# Patient Record
Sex: FEMALE | Race: WHITE | NOT HISPANIC OR LATINO | Employment: UNEMPLOYED | URBAN - METROPOLITAN AREA
[De-identification: names, ages, dates, MRNs, and addresses within clinical notes are randomized per-mention and may not be internally consistent; named-entity substitution may affect disease eponyms.]

---

## 2020-09-24 ENCOUNTER — OFFICE VISIT (OUTPATIENT)
Dept: FAMILY MEDICINE CLINIC | Facility: CLINIC | Age: 20
End: 2020-09-24

## 2020-09-24 VITALS
HEIGHT: 63 IN | RESPIRATION RATE: 18 BRPM | HEART RATE: 109 BPM | DIASTOLIC BLOOD PRESSURE: 84 MMHG | BODY MASS INDEX: 39.73 KG/M2 | TEMPERATURE: 98.1 F | WEIGHT: 224.2 LBS | OXYGEN SATURATION: 96 % | SYSTOLIC BLOOD PRESSURE: 122 MMHG

## 2020-09-24 DIAGNOSIS — F33.1 MODERATE RECURRENT MAJOR DEPRESSION (HCC): ICD-10-CM

## 2020-09-24 DIAGNOSIS — E66.9 OBESITY (BMI 35.0-39.9 WITHOUT COMORBIDITY): ICD-10-CM

## 2020-09-24 DIAGNOSIS — Z00.00 ANNUAL PHYSICAL EXAM: Primary | ICD-10-CM

## 2020-09-24 DIAGNOSIS — F41.9 ANXIETY: ICD-10-CM

## 2020-09-24 PROCEDURE — 99385 PREV VISIT NEW AGE 18-39: CPT | Performed by: FAMILY MEDICINE

## 2020-09-24 RX ORDER — CETIRIZINE HYDROCHLORIDE 10 MG/1
10 TABLET ORAL DAILY
COMMUNITY

## 2020-09-24 RX ORDER — FLUOXETINE 20 MG/1
20 TABLET, FILM COATED ORAL DAILY
COMMUNITY
End: 2020-09-28 | Stop reason: SDUPTHER

## 2020-09-24 RX ORDER — MELATONIN
1000 DAILY
COMMUNITY

## 2020-09-24 NOTE — PROGRESS NOTES
Assessment/Plan:       Problem List Items Addressed This Visit        Other    Moderate recurrent major depression ([facility] [address] )     +phq9 today  She notes she is seeing a therapist and feels that the prozac 20 mg is working well for her  She does not feel she needs to change dose  Relevant Medications    FLUoxetine (PROzac) 20 MG tablet    Anxiety- chronic, see above    Obesity (BMI 35 0-39 9 without comorbidity)     We discussed the importance of healthy diet/exercise  She notes strongly that she switched physicians b/c she did not want to be talked to about her weight or made to feel ashamed  We discussed approaching weight as more of a process of good health and she seems more comfortable w/ that  Does not feel ready to make changes at this point  Other Visit Diagnoses     Annual physical exam    -  Rodri-   Reviewed health maintenance/preventive care  Discussed healthy diet, activity for good health  Reviewed appropriate vaccinations- pt/mom will obtain records  Screening labwork ordered  Discussed pap test- she will think about this- she has not been sexually active and does not plan to be anytime soon  Relevant Orders    Comprehensive metabolic panel    Lipid Panel with Direct LDL reflex    TSH, 3rd generation with Free T4 reflex               Subjective:     Suzy Lockwood is a 22 y o  female here today with her mother and has the below chronic conditions:    Patient Active Problem List   Diagnosis    Moderate recurrent major depression ([facility] [address] )    Anxiety    Obesity (BMI 35 0-39 9 without comorbidity)     Current Outpatient Medications   Medication Sig Dispense Refill    cetirizine (ZyrTEC) 10 mg tablet Take 10 mg by mouth daily      cholecalciferol (VITAMIN D3) 1,000 units tablet Take 1,000 Units by mouth daily      FLUoxetine (PROzac) 20 MG tablet Take 20 mg by mouth daily       No current facility-administered medications for this visit             HPI:  Chief Complaint   Patient presents with    Establish Care     - CC above per clinical staff and reviewed  Pt here w mom to establish  Switching doctors b/c she felt like she was being judged for her weight  Doesn't want to work on weight loss  Counseling every 2 weeks  Anxiety and depression for several years  Prozac 20 mg seems to be helping well and she feels this dose is working well for her  Same therapist since 2015  Chante Huertas- in [facility] from [facility] earlier this year  Looking for jobs, living at home  [employer], [employer]  Is an introvert and is not bothered too much by pandemic- says she doesn't go out that much anyway  A little lactose intolerance- uses lactaid  A little gerd- every four months uses omeprazole for two weeks  This is enough to hold symptoms  The following portions of the patient's history were reviewed and updated as appropriate: allergies, current medications, past family history, past medical history, past social history, past surgical history and problem list     ROS:  Review of Systems   No fever, chills, congestion, chest pain, shortness of breath, nausea, vomiting, diarrhea, constipation, blood in stool, urinary concerns   Rest of ROS neg except as above  Objective:      /84   Pulse (!) 109   Temp 98 1 °F (36 7 °C) (Tympanic)   Resp 18   Ht 5' 3" (1 6 m)   Wt 102 kg (224 lb 3 2 oz)   SpO2 96%   BMI 39 72 kg/m²   BP Readings from Last 3 Encounters:   09/24/20 122/84     Wt Readings from Last 3 Encounters:   09/24/20 102 kg (224 lb 3 2 oz)               Physical Exam:   Physical Exam  Vitals signs and nursing note reviewed  Constitutional:       General: She is not in acute distress  Appearance: She is well-developed  She is obese  She is not ill-appearing  HENT:      Head: Normocephalic and atraumatic        Right Ear: Tympanic membrane and ear canal normal       Left Ear: Tympanic membrane and ear canal normal    Eyes:      Conjunctiva/sclera: Conjunctivae normal    Neck:      Musculoskeletal: Neck supple  Vascular: No carotid bruit  Cardiovascular:      Rate and Rhythm: Normal rate and regular rhythm  Heart sounds: Normal heart sounds  No murmur  Pulmonary:      Effort: Pulmonary effort is normal  No respiratory distress  Breath sounds: Normal breath sounds  No wheezing  Abdominal:      Palpations: Abdomen is soft  Tenderness: There is no abdominal tenderness  There is no guarding or rebound  Musculoskeletal:      Right lower leg: No edema  Left lower leg: No edema  Lymphadenopathy:      Cervical: No cervical adenopathy  Skin:     General: Skin is warm and dry  Neurological:      Mental Status: She is alert and oriented to person, place, and time     Psychiatric:         Mood and Affect: Mood normal          Behavior: Behavior normal

## 2020-09-26 PROBLEM — E66.9 OBESITY (BMI 35.0-39.9 WITHOUT COMORBIDITY): Status: ACTIVE | Noted: 2020-09-26

## 2020-09-26 PROBLEM — F41.9 ANXIETY: Status: ACTIVE | Noted: 2020-09-26

## 2020-09-26 PROBLEM — F33.1 MODERATE RECURRENT MAJOR DEPRESSION (HCC): Status: ACTIVE | Noted: 2020-09-26

## 2020-09-26 NOTE — ASSESSMENT & PLAN NOTE
We discussed the importance of healthy diet/exercise  She notes that she switched physicians b/c she did not want to be talked to about her weight or made to feel ashamed  We discussed approaching weight as more of a process of good health and she seems more comfortable w/ that  Does not feel ready to make changes at this point

## 2020-09-26 NOTE — ASSESSMENT & PLAN NOTE
+phq9 today  She notes she is seeing a therapist and feels that the prozac 20 mg is working well for her  She does not feel she needs to change dose

## 2020-09-28 DIAGNOSIS — F33.1 MODERATE RECURRENT MAJOR DEPRESSION (HCC): Primary | ICD-10-CM

## 2020-09-28 NOTE — TELEPHONE ENCOUNTER
Medication: pROZAC 20 MG   Last refilled: 9/24/20  Last Office Visit:  9/24/20  Next Office Visit: 12/24/20  Pharmacy:

## 2020-09-29 RX ORDER — FLUOXETINE 20 MG/1
20 TABLET, FILM COATED ORAL DAILY
Qty: 90 TABLET | Refills: 1 | Status: SHIPPED | OUTPATIENT
Start: 2020-09-29 | End: 2021-03-22 | Stop reason: SDUPTHER

## 2020-12-24 ENCOUNTER — TELEMEDICINE (OUTPATIENT)
Dept: FAMILY MEDICINE CLINIC | Facility: CLINIC | Age: 20
End: 2020-12-24

## 2020-12-24 DIAGNOSIS — F33.1 MODERATE RECURRENT MAJOR DEPRESSION (HCC): Primary | ICD-10-CM

## 2020-12-24 PROCEDURE — 99213 OFFICE O/P EST LOW 20 MIN: CPT | Performed by: FAMILY MEDICINE

## 2021-03-22 DIAGNOSIS — F33.1 MODERATE RECURRENT MAJOR DEPRESSION (HCC): ICD-10-CM

## 2021-03-22 RX ORDER — FLUOXETINE 20 MG/1
20 TABLET, FILM COATED ORAL DAILY
Qty: 90 TABLET | Refills: 0 | Status: SHIPPED | OUTPATIENT
Start: 2021-03-22 | End: 2021-04-28 | Stop reason: SDUPTHER

## 2021-03-22 NOTE — TELEPHONE ENCOUNTER
Medication refill requested: prozac  Last office visit: 12/24/20  Next office visit: 4/28/21  Last refilled: 9/29/20 #90x1  Pharmacy:   [address] [place] [address]  [address] [place] [id number]  Phone: [phone] Fax: [phone]      Pended: 90x0

## 2021-04-13 DIAGNOSIS — Z23 ENCOUNTER FOR IMMUNIZATION: ICD-10-CM

## 2021-04-28 ENCOUNTER — TELEMEDICINE (OUTPATIENT)
Dept: FAMILY MEDICINE CLINIC | Facility: CLINIC | Age: 21
End: 2021-04-28

## 2021-04-28 VITALS — WEIGHT: 224 LBS | HEIGHT: 63 IN | BODY MASS INDEX: 39.69 KG/M2

## 2021-04-28 DIAGNOSIS — F33.1 MODERATE RECURRENT MAJOR DEPRESSION (HCC): Primary | ICD-10-CM

## 2021-04-28 PROCEDURE — 99213 OFFICE O/P EST LOW 20 MIN: CPT | Performed by: FAMILY MEDICINE

## 2021-04-28 RX ORDER — FLUOXETINE 20 MG/1
20 TABLET, FILM COATED ORAL DAILY
Qty: 90 TABLET | Refills: 1 | Status: SHIPPED | OUTPATIENT
Start: 2021-04-28 | End: 2021-07-12 | Stop reason: SDUPTHER

## 2021-04-28 NOTE — PROGRESS NOTES
Virtual Regular Visit      Assessment/Plan:    Problem List Items Addressed This Visit        Other    Moderate recurrent major depression ([facility] [address] ) - Primary     Continue prozac 20 mg daily  Continue seeing therapist as scheduled  No changes today  F/u in about six months, sooner if needed  Relevant Medications    FLUoxetine (PROzac) 20 MG tablet          BMI Counseling: Body mass index is 39 68 kg/m²  The BMI is above normal  Nutrition recommendations include encouraging healthy choices of fruits and vegetables  Reason for visit is   Chief Complaint   Patient presents with    Follow-up     mood     Virtual Regular Visit        Encounter provider Ashlee Tang MD    Provider located at [address] [facility]  [address] [address] [phone]      Recent Visits  Date Type Provider Dept   04/28/21 Telemedicine Ashlee Tang MD Pg  [facility] recent visits within past 7 days and meeting all other requirements     Future Appointments  No visits were found meeting these conditions  Showing future appointments within next 150 days and meeting all other requirements        The patient was identified by name and date of birth  Betsey Koyanagi was informed that this is a telemedicine visit and that the visit is being conducted through [facility] and patient was informed that this is a secure, HIPAA-compliant platform  She agrees to proceed     My office door was closed  No one else was in the room  She acknowledged consent and understanding of privacy and security of the video platform  The patient has agreed to participate and understands they can discontinue the visit at any time  Patient is aware this is a billable service  Subjective  Betsey Koyanagi is a 23 y o  female who says she is doing overall well  She feels she is doing well overall  Feels like prozac is at a good dose       She is getting more fresh air in screened porch and this is helping  No sleep trouble  Still seeing therapist every two weeks  Seasonal allergies- zyrtec daily  No nasal spray  Gets Covid-19 next Friday  HPI     Past Medical History:   Diagnosis Date    Anxiety     Depression        Past Surgical History:   Procedure Laterality Date    NO PAST SURGERIES         Current Outpatient Medications   Medication Sig Dispense Refill    cetirizine (ZyrTEC) 10 mg tablet Take 10 mg by mouth daily      cholecalciferol (VITAMIN D3) 1,000 units tablet Take 1,000 Units by mouth daily      FLUoxetine (PROzac) 20 MG tablet Take 1 tablet (20 mg total) by mouth daily 90 tablet 1     No current facility-administered medications for this visit  Allergies   Allergen Reactions    Pollen Extract        Review of Systems    Video Exam    Vitals:    04/28/21 1015   Weight: 102 kg (224 lb)   Height: 5' 3" (1 6 m)     Wt Readings from Last 3 Encounters:   04/28/21 102 kg (224 lb)   09/24/20 102 kg (224 lb 3 2 oz)         Physical Exam  Vitals signs and nursing note reviewed  Constitutional:       Appearance: Normal appearance  She is not ill-appearing  HENT:      Head: Normocephalic and atraumatic  Pulmonary:      Effort: Pulmonary effort is normal  No respiratory distress  Neurological:      Mental Status: She is alert and oriented to person, place, and time  Psychiatric:         Mood and Affect: Mood normal          Behavior: Behavior normal           I spent 15 minutes directly with the patient during this visit      VIRTUAL VISIT DISCLAIMER    Cornel Blanton acknowledges that she has consented to an online visit or consultation  She understands that the online visit is based solely on information provided by her, and that, in the absence of a face-to-face physical evaluation by the physician, the diagnosis she receives is both limited and provisional in terms of accuracy and completeness   This is not intended to replace a full medical face-to-face evaluation by the physician  Kinsey Renner [facility] understands and accepts these terms

## 2021-04-30 ENCOUNTER — IMMUNIZATIONS (OUTPATIENT)
Dept: FAMILY MEDICINE CLINIC | Facility: HOSPITAL | Age: 21
End: 2021-04-30

## 2021-04-30 DIAGNOSIS — Z23 ENCOUNTER FOR IMMUNIZATION: Primary | ICD-10-CM

## 2021-04-30 PROCEDURE — 0001A SARS-COV-2 / COVID-19 MRNA VACCINE (PFIZER-BIONTECH) 30 MCG: CPT | Performed by: PHYSICIAN ASSISTANT

## 2021-04-30 PROCEDURE — 91300 SARS-COV-2 / COVID-19 MRNA VACCINE (PFIZER-BIONTECH) 30 MCG: CPT | Performed by: PHYSICIAN ASSISTANT

## 2021-04-30 NOTE — ASSESSMENT & PLAN NOTE
Continue prozac 20 mg daily  Continue seeing therapist as scheduled  No changes today  F/u in about six months, sooner if needed

## 2021-05-22 ENCOUNTER — IMMUNIZATIONS (OUTPATIENT)
Dept: FAMILY MEDICINE CLINIC | Facility: HOSPITAL | Age: 21
End: 2021-05-22

## 2021-05-22 DIAGNOSIS — Z23 ENCOUNTER FOR IMMUNIZATION: Primary | ICD-10-CM

## 2021-05-22 PROCEDURE — 91300 SARS-COV-2 / COVID-19 MRNA VACCINE (PFIZER-BIONTECH) 30 MCG: CPT

## 2021-05-22 PROCEDURE — 0002A SARS-COV-2 / COVID-19 MRNA VACCINE (PFIZER-BIONTECH) 30 MCG: CPT

## 2021-07-12 ENCOUNTER — TELEMEDICINE (OUTPATIENT)
Dept: FAMILY MEDICINE CLINIC | Facility: CLINIC | Age: 21
End: 2021-07-12

## 2021-07-12 VITALS — HEIGHT: 63 IN | WEIGHT: 224 LBS | BODY MASS INDEX: 39.69 KG/M2

## 2021-07-12 DIAGNOSIS — F33.1 MODERATE RECURRENT MAJOR DEPRESSION (HCC): Primary | ICD-10-CM

## 2021-07-12 PROCEDURE — 99213 OFFICE O/P EST LOW 20 MIN: CPT | Performed by: FAMILY MEDICINE

## 2021-07-12 RX ORDER — FLUOXETINE 10 MG/1
30 TABLET, FILM COATED ORAL DAILY
Qty: 90 TABLET | Refills: 2 | Status: SHIPPED | OUTPATIENT
Start: 2021-07-12 | End: 2021-12-02 | Stop reason: SDUPTHER

## 2021-07-12 NOTE — PROGRESS NOTES
Virtual Regular Visit      Assessment/Plan:    Problem List Items Addressed This Visit        Other    Moderate recurrent major depression ([address] ) - Primary     Increase prozac to 30 mg daily  Patient will message me through epic in about 2-4 weeks with update on how she is feeling, sooner if feeling any worse  Continue close f/u with therapist          Relevant Medications    FLUoxetine (PROzac) 10 MG tablet               Reason for visit is   Chief Complaint   Patient presents with    Depression     Looking to discuss dosage of my antidepressants    Virtual Regular Visit        Encounter provider Amilcar Matthew MD    Provider located at [address]  [address]  [place] [address]  [phone]      Recent Visits  No visits were found meeting these conditions  Showing recent visits within past 7 days and meeting all other requirements  Today's Visits  Date Type Provider Dept   07/12/21 Telemedicine Amilcar Matthew MD [facility]g [facility] today's visits and meeting all other requirements  Future Appointments  No visits were found meeting these conditions  Showing future appointments within next 150 days and meeting all other requirements       The patient was identified by name and date of birth  Helen Nathan was informed that this is a telemedicine visit and that the visit is being conducted through [address] [place] [address] Now and patient was informed that this is a secure, HIPAA-compliant platform  She agrees to proceed     My office door was closed  No one else was in the room  She acknowledged consent and understanding of privacy and security of the video platform  The patient has agreed to participate and understands they can discontinue the visit at any time  Patient is aware this is a billable service  Subjective  Helen Nathan is a 23 y o  female Following up for depression  Her mother is present for the virtual visit as well    She is currently taking prozac 20 mg daily and following up with her therapist regularly  Her therapist has mentioned that she should probably increase prozac  Feeling more anxious, more depressed  Feeling more tired, more sluggish  Not enough motivation  Has never been on higher dose than 20 mg prozac in the past     PHQ-9 Depression Screening    PHQ-9:   Frequency of the following problems over the past two weeks:      Little interest or pleasure in doing things: 1 - several days  Feeling down, depressed, or hopeless: 2 - more than half the days  Trouble falling or staying asleep, or sleeping too much: 0 - not at all  Feeling tired or having little energy: 2 - more than half the days  Poor appetite or overeatin - several days  Feeling bad about yourself - or that you are a failure or have let yourself or your family down: 2 - more than half the days  Trouble concentrating on things, such as reading the newspaper or watching television: 1 - several days  Moving or speaking so slowly that other people could have noticed  Or the opposite - being so fidgety or restless that you have been moving around a lot more than usual: 0 - not at all  Thoughts that you would be better off dead, or of hurting yourself in some way: 0 - not at all  PHQ-2 Score: 3  PHQ-9 Score: 9             HPI     Past Medical History:   Diagnosis Date    Anxiety     Depression        Past Surgical History:   Procedure Laterality Date    NO PAST SURGERIES         Current Outpatient Medications   Medication Sig Dispense Refill    cetirizine (ZyrTEC) 10 mg tablet Take 10 mg by mouth daily      cholecalciferol (VITAMIN D3) 1,000 units tablet Take 1,000 Units by mouth daily      FLUoxetine (PROzac) 10 MG tablet Take 3 tablets (30 mg total) by mouth daily 90 tablet 2     No current facility-administered medications for this visit          Allergies   Allergen Reactions    Pollen Extract        Review of Systems    Video Exam    Vitals: 07/12/21 1054   Weight: 102 kg (224 lb)   Height: 5' 3" (1 6 m)       Physical Exam  Vitals and nursing note reviewed  Constitutional:       General: She is not in acute distress  Appearance: She is not ill-appearing  Pulmonary:      Effort: No respiratory distress  Neurological:      Mental Status: She is alert and oriented to person, place, and time  Psychiatric:         Mood and Affect: Mood normal          Behavior: Behavior normal           I spent 12 minutes directly with the patient during this visit      VIRTUAL VISIT DISCLAIMER    Chivolaya Mamadou acknowledges that she has consented to an online visit or consultation  She understands that the online visit is based solely on information provided by her, and that, in the absence of a face-to-face physical evaluation by the physician, the diagnosis she receives is both limited and provisional in terms of accuracy and completeness  This is not intended to replace a full medical face-to-face evaluation by the physician  Brooke Aguirre [facility] understands and accepts these terms

## 2021-07-12 NOTE — ASSESSMENT & PLAN NOTE
Increase prozac to 30 mg daily  Patient will message me through epic in about 2-4 weeks with update on how she is feeling, sooner if feeling any worse    Continue close f/u with therapist

## 2021-12-28 ENCOUNTER — IMMUNIZATIONS (OUTPATIENT)
Dept: FAMILY MEDICINE CLINIC | Facility: HOSPITAL | Age: 21
End: 2021-12-28

## 2021-12-28 DIAGNOSIS — Z23 ENCOUNTER FOR IMMUNIZATION: Primary | ICD-10-CM

## 2021-12-28 PROCEDURE — 0001A COVID-19 PFIZER VACC 0.3 ML: CPT

## 2021-12-28 PROCEDURE — 91300 COVID-19 PFIZER VACC 0.3 ML: CPT

## 2022-02-03 DIAGNOSIS — F33.1 MODERATE RECURRENT MAJOR DEPRESSION (HCC): ICD-10-CM

## 2022-02-03 RX ORDER — FLUOXETINE 10 MG/1
30 TABLET, FILM COATED ORAL DAILY
Qty: 90 TABLET | Refills: 1 | Status: SHIPPED | OUTPATIENT
Start: 2022-02-03 | End: 2022-03-31 | Stop reason: SDUPTHER

## 2022-03-31 ENCOUNTER — OFFICE VISIT (OUTPATIENT)
Dept: FAMILY MEDICINE CLINIC | Facility: CLINIC | Age: 22
End: 2022-03-31

## 2022-03-31 VITALS
DIASTOLIC BLOOD PRESSURE: 78 MMHG | SYSTOLIC BLOOD PRESSURE: 120 MMHG | TEMPERATURE: 98 F | BODY MASS INDEX: 39.68 KG/M2 | OXYGEN SATURATION: 100 % | HEIGHT: 63 IN | HEART RATE: 103 BPM

## 2022-03-31 DIAGNOSIS — E66.9 OBESITY (BMI 35.0-39.9 WITHOUT COMORBIDITY): ICD-10-CM

## 2022-03-31 DIAGNOSIS — F33.1 MODERATE RECURRENT MAJOR DEPRESSION (HCC): ICD-10-CM

## 2022-03-31 DIAGNOSIS — E66.9 OBESITY (BMI 30-39.9): ICD-10-CM

## 2022-03-31 DIAGNOSIS — Z00.00 ANNUAL PHYSICAL EXAM: Primary | ICD-10-CM

## 2022-03-31 PROCEDURE — 99395 PREV VISIT EST AGE 18-39: CPT | Performed by: FAMILY MEDICINE

## 2022-03-31 RX ORDER — FLUOXETINE 20 MG/1
40 TABLET, FILM COATED ORAL DAILY
Qty: 180 TABLET | Refills: 1 | Status: SHIPPED | OUTPATIENT
Start: 2022-03-31 | End: 2022-06-25 | Stop reason: SDUPTHER

## 2022-03-31 NOTE — PROGRESS NOTES
Assessment/Plan:       Problem List Items Addressed This Visit        Other    Moderate recurrent major depression ([address] )     Continue working with therapist   Increase prozac to 40 mg daily  Advised setting small goals for herself  We discussed having her go to a small store and practice making a little bit of small talk when checking out  Discussed mental rehearsal for this and then practicing with situations like a  where time to talk is limited  She is concerned that her social skills have diminished with Covid-19 and social situations are particularly stressful for her  Relevant Medications    FLUoxetine (PROzac) 20 MG tablet    Obesity (BMI 35 0-39 9 without comorbidity)     Discussed diet and exercise  Discussed trying to get out to walk for both the physical and emotional benefit  Other Visit Diagnoses     Annual physical exam    -  Primary- check labs as noted  utd on Covid-19 vaccines  Has declined Tdap and HPV vax  Discussed obesity  Advised to schedule pap  Can do so here in our office or at gynecology  She will schedule when ready  Declines std screening- she has never been sexually active    Relevant Orders    Comprehensive metabolic panel    Lipid Panel with Direct LDL reflex    Obesity (BMI 30-39  9)        Relevant Orders    TSH, 3rd generation with Free T4 reflex                   Subjective:     Delores Mathew is a 19 y o  female here today with chief complaint below:  Chief Complaint   Patient presents with    Physical Exam    Labs     NONE          GYN referral pending     Medication Refill     None      - CC above per clinical staff and reviewed  HPI:  Pt is here for annual PE  Depression/anxiety- taking prozac 30 mg daily, feels she may need to increase  Usually her mood starts to improve in the spring  Menses somewhat irregular  A few years ago, less frequent  Past few months lighter periods but lasting a little longer    After Covid-19 booster, heavier period  No cramping/pain  LMP earlier this month  Not sexually active  Does not desire to be in a relationship  Dental care- not seeing dentist regular    Diet, exercise- not much exercise  Used to do yoga in basement, but friends living there now  Labs- has not yet done to date  She has remained fairly isolated at home during the pandemic and notes that some of the things that she was hoping to get comfortable with prior to the pandemic have been out of reach due to pandemic  Her mom was working with her on getting comfortable using public transportation, but that was put on hold  Struggles socially  Sees a therapist regularly  [address] in social situations    The following portions of the patient's history were reviewed and updated as appropriate: allergies, current medications, past family history, past medical history, past social history, past surgical history and problem list     ROS:  Review of Systems   No fever, chills, congestion, chest pain, shortness of breath, nausea, vomiting, diarrhea, constipation, blood in stool, urinary concerns    Rest of ROS neg except as above  Objective:      /78   Pulse 103   Temp 98 °F (36 7 °C)   Ht 5' 3" (1 6 m)   SpO2 100%   BMI 39 68 kg/m²   BP Readings from Last 3 Encounters:   03/31/22 120/78   09/24/20 122/84     Wt Readings from Last 3 Encounters:   07/12/21 102 kg (224 lb)   04/28/21 102 kg (224 lb)   09/24/20 102 kg (224 lb 3 2 oz)               Physical Exam:   Physical Exam  Vitals and nursing note reviewed  Constitutional:       Appearance: Normal appearance  She is well-developed  HENT:      Head: Normocephalic and atraumatic  Eyes:      Conjunctiva/sclera: Conjunctivae normal    Cardiovascular:      Rate and Rhythm: Normal rate and regular rhythm  Heart sounds: Normal heart sounds  No murmur heard  Pulmonary:      Effort: Pulmonary effort is normal  No respiratory distress        Breath sounds: Normal breath sounds  No wheezing  Abdominal:      Palpations: Abdomen is soft  Tenderness: There is no abdominal tenderness  There is no guarding or rebound  Musculoskeletal:      Cervical back: Neck supple  Right lower leg: No edema  Left lower leg: No edema  Lymphadenopathy:      Cervical: No cervical adenopathy  Skin:     General: Skin is warm and dry  Neurological:      Mental Status: She is alert and oriented to person, place, and time     Psychiatric:         Behavior: Behavior normal  negative Affect and characteristics of appearance, verbalizations, behaviors are appropriate

## 2022-04-01 NOTE — ASSESSMENT & PLAN NOTE
Continue working with therapist   Increase prozac to 40 mg daily  Advised setting small goals for herself  We discussed having her go to a small store and practice making a little bit of small talk when checking out  Discussed mental rehearsal for this and then practicing with situations like a  where time to talk is limited  She is concerned that her social skills have diminished with Covid-19 and social situations are particularly stressful for her

## 2022-04-01 NOTE — ASSESSMENT & PLAN NOTE
Discussed diet and exercise  Discussed trying to get out to walk for both the physical and emotional benefit

## 2022-04-13 ENCOUNTER — LAB (OUTPATIENT)
Dept: LAB | Age: 22
End: 2022-04-13
Payer: COMMERCIAL

## 2022-04-13 DIAGNOSIS — Z00.00 ANNUAL PHYSICAL EXAM: ICD-10-CM

## 2022-04-13 DIAGNOSIS — E66.9 OBESITY (BMI 30-39.9): ICD-10-CM

## 2022-04-13 LAB
ALBUMIN SERPL BCP-MCNC: 4.2 G/DL (ref 3.5–5)
ALP SERPL-CCNC: 68 U/L (ref 46–116)
ALT SERPL W P-5'-P-CCNC: 50 U/L (ref 12–78)
ANION GAP SERPL CALCULATED.3IONS-SCNC: 8 MMOL/L (ref 4–13)
AST SERPL W P-5'-P-CCNC: 29 U/L (ref 5–45)
BILIRUB SERPL-MCNC: 0.34 MG/DL (ref 0.2–1)
BUN SERPL-MCNC: 12 MG/DL (ref 5–25)
CALCIUM SERPL-MCNC: 9.6 MG/DL (ref 8.3–10.1)
CHLORIDE SERPL-SCNC: 103 MMOL/L (ref 100–108)
CHOLEST SERPL-MCNC: 204 MG/DL
CO2 SERPL-SCNC: 24 MMOL/L (ref 21–32)
CREAT SERPL-MCNC: 0.69 MG/DL (ref 0.6–1.3)
GFR SERPL CREATININE-BSD FRML MDRD: 123 ML/MIN/1.73SQ M
GLUCOSE P FAST SERPL-MCNC: 94 MG/DL (ref 65–99)
HDLC SERPL-MCNC: 39 MG/DL
LDLC SERPL CALC-MCNC: 126 MG/DL (ref 0–100)
POTASSIUM SERPL-SCNC: 3.7 MMOL/L (ref 3.5–5.3)
PROT SERPL-MCNC: 8.3 G/DL (ref 6.4–8.2)
SODIUM SERPL-SCNC: 135 MMOL/L (ref 136–145)
TRIGL SERPL-MCNC: 193 MG/DL
TSH SERPL DL<=0.05 MIU/L-ACNC: 3.21 UIU/ML (ref 0.45–4.5)

## 2022-04-13 PROCEDURE — 80061 LIPID PANEL: CPT

## 2022-04-13 PROCEDURE — 36415 COLL VENOUS BLD VENIPUNCTURE: CPT

## 2022-04-13 PROCEDURE — 84443 ASSAY THYROID STIM HORMONE: CPT

## 2022-04-13 PROCEDURE — 80053 COMPREHEN METABOLIC PANEL: CPT

## 2022-04-17 PROBLEM — E78.2 HYPERLIPIDEMIA, MIXED: Status: ACTIVE | Noted: 2022-04-17

## 2022-04-17 NOTE — RESULT ENCOUNTER NOTE
Felipe Becerra Stager shows that your cholesterol is a bit elevated  I'd like you to work on this by increasing exercise (taking walks more regularly) and working on your diet (decreasing carbs/fats in your diet)  Let's recheck the labs in six months to follow up on this  We'll mail you the lab slip  Howie Reyes MD

## 2022-06-25 DIAGNOSIS — F33.1 MODERATE RECURRENT MAJOR DEPRESSION (HCC): ICD-10-CM

## 2022-06-27 RX ORDER — FLUOXETINE 20 MG/1
40 TABLET, FILM COATED ORAL DAILY
Qty: 180 TABLET | Refills: 0 | Status: SHIPPED | OUTPATIENT
Start: 2022-06-27

## 2022-08-30 ENCOUNTER — OFFICE VISIT (OUTPATIENT)
Dept: FAMILY MEDICINE CLINIC | Facility: CLINIC | Age: 22
End: 2022-08-30

## 2022-08-30 VITALS
DIASTOLIC BLOOD PRESSURE: 74 MMHG | OXYGEN SATURATION: 98 % | HEART RATE: 103 BPM | BODY MASS INDEX: 39.68 KG/M2 | TEMPERATURE: 97.5 F | HEIGHT: 63 IN | RESPIRATION RATE: 18 BRPM | SYSTOLIC BLOOD PRESSURE: 116 MMHG

## 2022-08-30 DIAGNOSIS — F33.1 MODERATE RECURRENT MAJOR DEPRESSION (HCC): Primary | ICD-10-CM

## 2022-08-30 PROCEDURE — 99214 OFFICE O/P EST MOD 30 MIN: CPT | Performed by: FAMILY MEDICINE

## 2022-08-30 RX ORDER — BUPROPION HYDROCHLORIDE 150 MG/1
150 TABLET ORAL EVERY MORNING
Qty: 30 TABLET | Refills: 2 | Status: SHIPPED | OUTPATIENT
Start: 2022-08-30 | End: 2022-10-26 | Stop reason: SDUPTHER

## 2022-08-30 RX ORDER — FLUOXETINE 20 MG/1
20 TABLET, FILM COATED ORAL DAILY
Qty: 90 TABLET | Refills: 1 | Status: SHIPPED | OUTPATIENT
Start: 2022-08-30

## 2022-08-30 NOTE — ASSESSMENT & PLAN NOTE
Continue routine visits with therapist- following up every other week presently  Decrease prozac to 20 mg daily as she had no improvement with the increase to 40 mg   Add wellbutrin xl 150 mg daily  F/u in 2 months, sooner if needed  Reviewed use and side effects  If she feels any increased anxiety/worsening of mood- she will reach out    Good family support

## 2022-08-30 NOTE — PROGRESS NOTES
Assessment/Plan:       Problem List Items Addressed This Visit        Other    Moderate recurrent major depression ([address] ) - Primary     Continue routine visits with therapist- following up every other week presently  Decrease prozac to 20 mg daily as she had no improvement with the increase to 40 mg   Add wellbutrin xl 150 mg daily  F/u in 2 months, sooner if needed  Reviewed use and side effects  If she feels any increased anxiety/worsening of mood- she will reach out  Good family support         Relevant Medications    buPROPion (Wellbutrin XL) 150 mg 24 hr tablet    FLUoxetine (PROzac) 20 MG tablet               Subjective:     Selma Cordoba is a 19 y o  female here today with chief complaint below:  Chief Complaint   Patient presents with    Depression     Would like to talk about changing medications      - CC above per clinical staff and reviewed  HPI:  Pt here for visit with her mother to follow up on mood/depression  She notes that her depression is worse  Dad was dx with stage 4 prostate cancer at age 77 in late May  Has been a tough summer b/c of this  Mets to bone and lungs  No other major stresses  She is not working right now  Says the pandemic set her back- isn't used to being out of the house much  Tries to be home more for her dad  Some mornings she is struggling to get out of bed in the AM   Wants to ultimately find a job, but is working with her therapist on setting small goals to start, then moving more towards the bigger goals  Not getting out and walking given the recent heat, doesn't like hot weather  Would like to try changing medications  Didn't see any improvement with changing from prozac 20 mg daily to 40 mg daily  Dad is on wellbutrin and does very well with this and Dad's mom had done well with this too      PHQ-2/9 Depression Screening    Little interest or pleasure in doing things: 3 - nearly every day  Feeling down, depressed, or hopeless: 2 - more than half the days  Trouble falling or staying asleep, or sleeping too much: 1 - several days  Feeling tired or having little energy: 2 - more than half the days  Poor appetite or overeatin - not at all  Feeling bad about yourself - or that you are a failure or have let yourself or your family down: 1 - several days  Trouble concentrating on things, such as reading the newspaper or watching television: 3 - nearly every day  Moving or speaking so slowly that other people could have noticed  Or the opposite - being so fidgety or restless that you have been moving around a lot more than usual: 1 - several days  Thoughts that you would be better off dead, or of hurting yourself in some way: 0 - not at all  PHQ-9 Score: 13   PHQ-9 Interpretation: Moderate depression        CLARA-7 Flowsheet Screening    Flowsheet Row Most Recent Value   Over the last 2 weeks, how often have you been bothered by any of the following problems? Feeling nervous, anxious, or on edge 1   Not being able to stop or control worrying 1   Worrying too much about different things 1   Trouble relaxing 0   Being so restless that it is hard to sit still 0   Becoming easily annoyed or irritable 2   Feeling afraid as if something awful might happen 1   CLARA-7 Total Score 6            The following portions of the patient's history were reviewed and updated as appropriate: allergies, current medications, past family history, past medical history, past social history, past surgical history and problem list     ROS:  Review of Systems     Objective:      /74   Pulse 103   Temp 97 5 °F (36 4 °C)   Resp 18   Ht 5' 3" (1 6 m)   SpO2 98%   BMI 39 68 kg/m²   BP Readings from Last 3 Encounters:   22 116/74   22 120/78   20 122/84     Wt Readings from Last 3 Encounters:   21 102 kg (224 lb)   21 102 kg (224 lb)   20 102 kg (224 lb 3 2 oz)               Physical Exam:   Physical Exam  Vitals and nursing note reviewed  Constitutional:       Appearance: She is not ill-appearing  HENT:      Head: Normocephalic  Cardiovascular:      Rate and Rhythm: Normal rate and regular rhythm  Heart sounds: No murmur heard  Pulmonary:      Effort: Pulmonary effort is normal       Breath sounds: Normal breath sounds  No wheezing or rhonchi  Neurological:      Mental Status: She is alert  Psychiatric:         Behavior: Behavior normal       Comments: Depressed affect         BMI Counseling: BMI was not able to be calculated due to patient refusing height and/or weight

## 2022-10-26 ENCOUNTER — OFFICE VISIT (OUTPATIENT)
Dept: FAMILY MEDICINE CLINIC | Facility: CLINIC | Age: 22
End: 2022-10-26

## 2022-10-26 VITALS
HEIGHT: 63 IN | SYSTOLIC BLOOD PRESSURE: 130 MMHG | OXYGEN SATURATION: 98 % | BODY MASS INDEX: 39.68 KG/M2 | HEART RATE: 97 BPM | TEMPERATURE: 97.2 F | DIASTOLIC BLOOD PRESSURE: 80 MMHG

## 2022-10-26 DIAGNOSIS — F41.9 ANXIETY: ICD-10-CM

## 2022-10-26 DIAGNOSIS — F33.1 MODERATE RECURRENT MAJOR DEPRESSION (HCC): Primary | ICD-10-CM

## 2022-10-26 PROCEDURE — 99213 OFFICE O/P EST LOW 20 MIN: CPT | Performed by: FAMILY MEDICINE

## 2022-10-26 RX ORDER — BUPROPION HYDROCHLORIDE 150 MG/1
150 TABLET ORAL EVERY MORNING
Qty: 90 TABLET | Refills: 1 | Status: SHIPPED | OUTPATIENT
Start: 2022-10-26

## 2022-10-26 NOTE — PROGRESS NOTES
Assessment/Plan:       Problem List Items Addressed This Visit        Other    Moderate recurrent major depression ([address] ) - Primary     Improved from previous with addition of wellbutrin xl 150 mg daily to prozac 20 mg daily  Continue w/ this   Follow up in 4-6 months  Mild increase in BP with addition to wellbutrin, continue to monitor  Relevant Medications    buPROPion (Wellbutrin XL) 150 mg 24 hr tablet    Anxiety     Stable w/ prozac, not worsened w/ addition of wellbutrin  Continue therapist visits  Subjective:     Susana Andino is a 19 y o  female here today with chief complaint below:  No chief complaint on file  f/u depression  - CC above per clinical staff and reviewed  HPI:  Pt is here for f/u on depression and anxiety w/ her mother  She notes she is feeling improvement since last visit with addition of wellbutrin and decreasing prozac to 20 mg daily  She is continuing to see her therapist Neptali Rico regularly  Notes her anxiety is about the same, but this is at a manageable level for her  Depression is improved  Mom notes she is ruminating less, getting up less at night to talk, and is following through on assignments from her therapist more regularly  No negative side effects  Sleep is good  PHQ-2/9 Depression Screening    Little interest or pleasure in doing things: 1 - several days  Feeling down, depressed, or hopeless: 1 - several days  Trouble falling or staying asleep, or sleeping too much: 0 - not at all  Feeling tired or having little energy: 1 - several days  Poor appetite or overeatin - not at all  Feeling bad about yourself - or that you are a failure or have let yourself or your family down: 1 - several days  Trouble concentrating on things, such as reading the newspaper or watching television: 3 - nearly every day  Moving or speaking so slowly that other people could have noticed   Or the opposite - being so fidgety or restless that you have been moving around a lot more than usual: 0 - not at all  Thoughts that you would be better off dead, or of hurting yourself in some way: 0 - not at all  PHQ-9 Score: 7   PHQ-9 Interpretation: Mild depression        CLARA-7 Flowsheet Screening    Flowsheet Row Most Recent Value   Over the last 2 weeks, how often have you been bothered by any of the following problems? Feeling nervous, anxious, or on edge 1   Not being able to stop or control worrying 1   Worrying too much about different things 1   Trouble relaxing 0   Being so restless that it is hard to sit still 0   Becoming easily annoyed or irritable 1   Feeling afraid as if something awful might happen 2   CLARA-7 Total Score 6            The following portions of the patient's history were reviewed and updated as appropriate: allergies, current medications, past family history, past medical history, past social history, past surgical history and problem list     ROS:  Review of Systems       Objective:      /80 (Cuff Size: Large)   Pulse 97   Temp (!) 97 2 °F (36 2 °C) (Temporal)   Ht 5' 3" (1 6 m)   SpO2 98%   BMI 39 68 kg/m²   BP Readings from Last 3 Encounters:   10/26/22 130/80   08/30/22 116/74   03/31/22 120/78     Wt Readings from Last 3 Encounters:   07/12/21 102 kg (224 lb)   04/28/21 102 kg (224 lb)   09/24/20 102 kg (224 lb 3 2 oz)               Physical Exam:   Physical Exam  Vitals and nursing note reviewed  Constitutional:       Appearance: Normal appearance  HENT:      Head: Normocephalic and atraumatic  Cardiovascular:      Rate and Rhythm: Normal rate and regular rhythm  Heart sounds: No murmur heard  Pulmonary:      Effort: Pulmonary effort is normal       Breath sounds: Normal breath sounds  No wheezing or rhonchi  Neurological:      Mental Status: She is alert     Psychiatric:         Mood and Affect: Mood normal          Behavior: Behavior normal

## 2022-10-26 NOTE — ASSESSMENT & PLAN NOTE
Improved from previous with addition of wellbutrin xl 150 mg daily to prozac 20 mg daily  Continue w/ this   Follow up in 4-6 months  Mild increase in BP with addition to wellbutrin, continue to monitor

## 2023-04-02 DIAGNOSIS — F33.1 MODERATE RECURRENT MAJOR DEPRESSION (HCC): ICD-10-CM

## 2023-04-03 RX ORDER — FLUOXETINE 20 MG/1
20 TABLET, FILM COATED ORAL DAILY
Qty: 90 TABLET | Refills: 1 | Status: SHIPPED | OUTPATIENT
Start: 2023-04-03

## 2023-04-03 RX ORDER — BUPROPION HYDROCHLORIDE 150 MG/1
150 TABLET ORAL EVERY MORNING
Qty: 90 TABLET | Refills: 1 | Status: SHIPPED | OUTPATIENT
Start: 2023-04-03

## 2023-09-06 ENCOUNTER — OFFICE VISIT (OUTPATIENT)
Dept: FAMILY MEDICINE CLINIC | Facility: CLINIC | Age: 23
End: 2023-09-06

## 2023-09-06 VITALS
HEART RATE: 98 BPM | OXYGEN SATURATION: 98 % | BODY MASS INDEX: 39.68 KG/M2 | DIASTOLIC BLOOD PRESSURE: 86 MMHG | RESPIRATION RATE: 20 BRPM | SYSTOLIC BLOOD PRESSURE: 132 MMHG | HEIGHT: 63 IN | TEMPERATURE: 97 F

## 2023-09-06 DIAGNOSIS — E78.2 HYPERLIPIDEMIA, MIXED: ICD-10-CM

## 2023-09-06 DIAGNOSIS — R00.2 PALPITATION: Primary | ICD-10-CM

## 2023-09-06 DIAGNOSIS — F33.1 MODERATE RECURRENT MAJOR DEPRESSION (HCC): ICD-10-CM

## 2023-09-06 PROCEDURE — 99214 OFFICE O/P EST MOD 30 MIN: CPT | Performed by: FAMILY MEDICINE

## 2023-09-06 PROCEDURE — 93000 ELECTROCARDIOGRAM COMPLETE: CPT | Performed by: FAMILY MEDICINE

## 2023-09-06 NOTE — ASSESSMENT & PLAN NOTE
History sounds most suggestive of occasional palpitations (PACs or PVCs)  No associated CP, SOB, dizziness, lightheadedness. EKG today NSR without ectopy (scanned)  Check CBC, CMP, TSH  Ordered holter monitor. Pt does not have insurance- mom to check on price first to see if this is reasonable to obtain.

## 2023-09-06 NOTE — ASSESSMENT & PLAN NOTE
Repeat labs  Doing well working on diet, introducing new healthy foods  Has started exercising at home

## 2023-09-06 NOTE — PROGRESS NOTES
Assessment/Plan:       Problem List Items Addressed This Visit        Other    Moderate recurrent major depression ([address])     Continuing to follow up with therapist  Some increased stress recently but feels she is doing well on prozac 20 mg daily and wellbutrin 150 mg daily and would like to continue on the same doses. Hyperlipidemia, mixed     Repeat labs  Doing well working on diet, introducing new healthy foods  Has started exercising at home         Relevant Orders    Comprehensive metabolic panel    Lipid Panel with Direct LDL reflex    Palpitation - Primary     History sounds most suggestive of occasional palpitations (PACs or PVCs)  No associated CP, SOB, dizziness, lightheadedness. EKG today NSR without ectopy (scanned)  Check CBC, CMP, TSH  Ordered holter monitor. Pt does not have insurance- mom to check on price first to see if this is reasonable to obtain. Relevant Orders    CBC and differential    Comprehensive metabolic panel    TSH, 3rd generation with Free T4 reflex    POCT ECG (Completed)    Holter monitor              Subjective:     Larry Saldaña is a 23 y.o. female here today with chief complaint below:  Chief Complaint   Patient presents with   • Chest Pain     Started at the end of July, comes and goes   • Depression     F/u     - CC above per clinical staff and reviewed. HPI:  Pt here w/ mom for follow up mood as well as concerns about sensation in chest.    Feeling a fluttery or pulse feeling in her chest. Happens for a second then goes away. This week it is happening less. Started w/ this end of July. Beginning of August, improved, but then end of August felt it more. Feels the flutter for a second at a time every day, varies how frequently she feels it. Sometimes 1-2 times, sometimes 6-7 times. It happens at rest.  No associated CP or SOB. No lightheadedness or dizziness.   Feels this sensation in her center chest.   Notes that in the past, it would happen very rarely but now she feels it every day. It is gone as quickly as she notices it. Doesn't drink caffeine. Anxiety doesn't feel any worse than baseline. Trying to eat healthier and exercise. Likes to dance to you tube videos. No CP with exercise. Finds she gets SOB but feels at an appropriate level for exercising. Diet- tends to eat a lot of white meats, pasta, bagels, adding yogurt/granola and more fruits. Adding in some veggies  Previously had a fairly limited diet. She is working to try new things. Interviewed for a job- this was a big step for her. Feels like mood is actually pretty good. A little worse the past few weeks, therapist was off for vacation, some stresses, but feels she is overall doing well. Would like to continue on same dose prozac and wellbutrin. The following portions of the patient's history were reviewed and updated as appropriate: allergies, current medications, past family history, past medical history, past social history, past surgical history and problem list.    ROS:  Review of Systems       Objective:      /86 (BP Location: Right arm, Patient Position: Sitting, Cuff Size: Large)   Pulse 98   Temp (!) 97 °F (36.1 °C) (Temporal)   Resp 20   Ht 5' 3" (1.6 m)   SpO2 98%   BMI 39.68 kg/m²   BP Readings from Last 3 Encounters:   09/06/23 132/86   10/26/22 130/80   08/30/22 116/74     Wt Readings from Last 3 Encounters:   07/12/21 102 kg (224 lb)   04/28/21 102 kg (224 lb)   09/24/20 102 kg (224 lb 3.2 oz)             Physical Exam:   Physical Exam  Vitals and nursing note reviewed. Constitutional:       Appearance: Normal appearance. She is well-developed. She is not ill-appearing. HENT:      Head: Normocephalic and atraumatic. Cardiovascular:      Rate and Rhythm: Normal rate and regular rhythm. Heart sounds: Normal heart sounds. No murmur heard. Pulmonary:      Effort: Pulmonary effort is normal. No respiratory distress.       Breath sounds: Normal breath sounds. No wheezing. Abdominal:      Palpations: Abdomen is soft. Tenderness: There is no abdominal tenderness. There is no guarding or rebound. Musculoskeletal:      Cervical back: Neck supple. Right lower leg: No edema. Left lower leg: No edema. Lymphadenopathy:      Cervical: No cervical adenopathy. Skin:     General: Skin is warm and dry. Neurological:      Mental Status: She is alert and oriented to person, place, and time. Psychiatric:         Mood and Affect: Mood normal.         Behavior: Behavior normal.         BMI Counseling: BMI was not able to be calculated due to patient refusing height and/or weight.

## 2023-09-06 NOTE — ASSESSMENT & PLAN NOTE
Continuing to follow up with therapist  Some increased stress recently but feels she is doing well on prozac 20 mg daily and wellbutrin 150 mg daily and would like to continue on the same doses.

## 2024-03-03 DIAGNOSIS — F33.1 MODERATE RECURRENT MAJOR DEPRESSION (HCC): Primary | ICD-10-CM

## 2024-03-03 RX ORDER — FLUOXETINE 20 MG/1
20 TABLET, FILM COATED ORAL DAILY
Qty: 90 TABLET | Refills: 1 | Status: SHIPPED | OUTPATIENT
Start: 2024-03-03

## 2024-03-03 RX ORDER — BUPROPION HYDROCHLORIDE 150 MG/1
150 TABLET ORAL EVERY MORNING
Qty: 90 TABLET | Refills: 1 | Status: SHIPPED | OUTPATIENT
Start: 2024-03-03

## 2024-09-12 ENCOUNTER — OFFICE VISIT (OUTPATIENT)
Dept: FAMILY MEDICINE CLINIC | Facility: CLINIC | Age: 24
End: 2024-09-12

## 2024-09-12 VITALS
RESPIRATION RATE: 16 BRPM | TEMPERATURE: 97.8 F | HEIGHT: 63 IN | OXYGEN SATURATION: 98 % | SYSTOLIC BLOOD PRESSURE: 132 MMHG | HEART RATE: 101 BPM | BODY MASS INDEX: 39.68 KG/M2 | DIASTOLIC BLOOD PRESSURE: 86 MMHG

## 2024-09-12 DIAGNOSIS — F41.9 ANXIETY: ICD-10-CM

## 2024-09-12 DIAGNOSIS — F33.1 MODERATE RECURRENT MAJOR DEPRESSION (HCC): Primary | ICD-10-CM

## 2024-09-12 PROCEDURE — 99214 OFFICE O/P EST MOD 30 MIN: CPT | Performed by: FAMILY MEDICINE

## 2024-09-12 RX ORDER — FLUOXETINE 20 MG/1
40 TABLET, FILM COATED ORAL DAILY
Qty: 180 TABLET | Refills: 1 | Status: SHIPPED | OUTPATIENT
Start: 2024-09-12

## 2024-09-12 RX ORDER — BUPROPION HYDROCHLORIDE 150 MG/1
150 TABLET ORAL EVERY MORNING
Qty: 90 TABLET | Refills: 1 | Status: SHIPPED | OUTPATIENT
Start: 2024-09-12

## 2024-09-12 NOTE — PROGRESS NOTES
Ambulatory Visit  Name: Ema Boston      : 2000      MRN: [id number]  Encounter Provider: Alma Nugent MD  Encounter Date: 2024   Encounter department: [facility]    Assessment & Plan  Moderate recurrent major depression (HCC)  Depression Screening Follow-up Plan: Patient's depression screening was positive with a PHQ-9 score of 11. Pt will continue on Wellbutrin  mg daily in the morning. She will increase Prozac to 40 mg daily.  She will continue to work with her therapist.  We have also discussed setting one goal to work on for the next 4 weeks.  She will message me in 4 weeks with an update on how she is feeling, sooner if needed.  We will then follow-up 2 months from now.    Orders:    FLUoxetine 20 MG tablet; Take 2 tablets (40 mg total) by mouth daily    buPROPion (Wellbutrin XL) 150 mg 24 hr tablet; Take 1 tablet (150 mg total) by mouth every morning    Anxiety  See plan under depression.  Continuing Wellbutrin 150 mg daily.  Increasing Prozac to 40 mg daily.          Patient Instructions   Increase the Prozac to 40 mg daily.  You can take 2 of the 20 mg capsules, and I have sent a 90-day supply with a refill of this dose into the pharmacy for you  I want to set 1 goal for the next 4 weeks.  It can be getting outside for 10 minutes, setting a screen free interval during her day, doing some crafting or something that is a distraction, etc.  Send me a message in 4 weeks, sooner if needed, with an update on how you are feeling and how you are doing with the goal to use that.  Then we will set a new goal and follow-up in the office 2 months from now    History of Present Illness     Pt here w her mom to discuss worsening depression.  She would like to consider changing medication in some way.    It has been a hard summer.  She does not generally enjoy summer time anyway, does not enjoy heat/humidity.  She has had some situational stresses happening over  the summer.  Also with COVID cases increasing, she is less inclined to do things outside of the house.  She and her family tried to be very conscientious about avoiding germs given that patient's father has prostate cancer.  Unfortunately, his cancer has been progressing.  This has been difficult.    She struggles with social situations in general.  She has been trying to work with her therapist on setting some goals, such as trying to briefly make small talk with someone that she does not know.  Masking and avoiding public places has made this more challenging.    A lot of brain fog, which is bothersome.  Sleep has been good.  She keeps a good sleep schedule, better than the others in her household.    She notes that both depression and anxiety are worse.  Anxiety more bothersome over the past month.    In the past- exercise has helped (hoping that now that it is cooler outside she can get out to walk)  Seeing therapist regularly.  Her therapist has suggested talking to me about medication changes.    She can identify a few things which tend to help with her anxiety and mood:  Spending less time online tends to help.  Avoids news.  Spiritual practices help  Going outside helps.    Stress w upcoming election.  Worries about outcome.  Tries to avoid watching anything political at this point as it just increases her stress    PHQ-2/9 Depression Screening    Little interest or pleasure in doing things: 3 - nearly every day  Feeling down, depressed, or hopeless: 1 - several days  Trouble falling or staying asleep, or sleeping too much: 1 - several days  Feeling tired or having little energy: 2 - more than half the days  Poor appetite or overeatin - several days  Feeling bad about yourself - or that you are a failure or have let   yourself or your family down: 1 - several days  Trouble concentrating on things, such as reading the newspaper or watching   television: 2 - more than half the days  Moving or speaking so  "slowly that other people could have noticed. Or the   opposite - being so fidgety or restless that you have been moving around a   lot more than usual: 0 - not at all  Thoughts that you would be better off dead, or of hurting yourself in some   way: 0 - not at all  PHQ-9 Score: 11  PHQ-9 Interpretation: Moderate depression     CLARA-7 Flowsheet Screening    Flowsheet Row Most Recent Value   Over the last two weeks, how often have you been bothered by the following   problems?     Feeling nervous, anxious, or on edge 2   Not being able to stop or control worrying 2   Worrying too much about different things 2   Trouble relaxing  1   Being so restless that it's hard to sit still 1   Becoming easily annoyed or irritable  3   Feeling afraid as if something awful might happen 3   How difficult have these problems made it for you to do your work, take   care of things at home, or get along with other people?  Very difficult   CLARA Score  14            Depression  This is a chronic problem. The current episode started more than 1 year ago. The problem occurs constantly. The problem has been gradually worsening.         Review of Systems   Psychiatric/Behavioral:  Positive for depression.            Objective     /86 (BP Location: Right arm, Patient Position: Sitting, Cuff Size: Large)   Pulse 101   Temp 97.8 °F (36.6 °C) (Temporal)   Resp 16   Ht 5' 3\" (1.6 m)   SpO2 98%   BMI 39.68 kg/m²   Wt Readings from Last 3 Encounters:   07/12/21 102 kg (224 lb)   04/28/21 102 kg (224 lb)   09/24/20 102 kg (224 lb 3.2 oz)     BP Readings from Last 3 Encounters:   09/12/24 132/86   09/06/23 132/86   10/26/22 130/80      Physical Exam  Vitals and nursing note reviewed.   Constitutional:       Appearance: Normal appearance. She is not ill-appearing.   Neurological:      Mental Status: She is alert and oriented to person, place, and time.      Gait: Gait normal.   Psychiatric:      Comments: Mildly depressed and anxious " affect.  Good insight.  Normal thought content.

## 2024-09-12 NOTE — ASSESSMENT & PLAN NOTE
Depression Screening Follow-up Plan: Patient's depression screening was positive with a PHQ-9 score of 11. Pt will continue on Wellbutrin  mg daily in the morning. She will increase Prozac to 40 mg daily.  She will continue to work with her therapist.  We have also discussed setting one goal to work on for the next 4 weeks.  She will message me in 4 weeks with an update on how she is feeling, sooner if needed.  We will then follow-up 2 months from now.    Orders:    FLUoxetine 20 MG tablet; Take 2 tablets (40 mg total) by mouth daily    buPROPion (Wellbutrin XL) 150 mg 24 hr tablet; Take 1 tablet (150 mg total) by mouth every morning

## 2024-09-12 NOTE — PATIENT INSTRUCTIONS
Increase the Prozac to 40 mg daily.  You can take 2 of the 20 mg capsules, and I have sent a 90-day supply with a refill of this dose into the pharmacy for you  I want to set 1 goal for the next 4 weeks.  It can be getting outside for 10 minutes, setting a screen free interval during her day, doing some crafting or something that is a distraction, etc.  Send me a message in 4 weeks, sooner if needed, with an update on how you are feeling and how you are doing with the goal to use that.  Then we will set a new goal and follow-up in the office 2 months from now

## 2024-09-12 NOTE — ASSESSMENT & PLAN NOTE
See plan under depression.  Continuing Wellbutrin 150 mg daily.  Increasing Prozac to 40 mg daily.

## 2024-11-19 ENCOUNTER — TELEPHONE (OUTPATIENT)
Dept: FAMILY MEDICINE CLINIC | Facility: CLINIC | Age: 24
End: 2024-11-19

## 2024-11-21 ENCOUNTER — TELEMEDICINE (OUTPATIENT)
Dept: FAMILY MEDICINE CLINIC | Facility: CLINIC | Age: 24
End: 2024-11-21

## 2024-11-21 DIAGNOSIS — J30.2 SEASONAL ALLERGIES: ICD-10-CM

## 2024-11-21 DIAGNOSIS — F33.1 MODERATE RECURRENT MAJOR DEPRESSION (HCC): Primary | ICD-10-CM

## 2024-11-21 PROCEDURE — 99213 OFFICE O/P EST LOW 20 MIN: CPT | Performed by: FAMILY MEDICINE

## 2024-11-21 NOTE — ASSESSMENT & PLAN NOTE
Doing well with increased dose of prozac since last visit.    Continue prozac 40 mg daily and wellbutrin xl 150 mg daily  Has refills, will message when needs again.   Ok to f/u in six months, sooner if needed  Pt to consider light therapy lamp for seasonal depressive symptoms.   If depression worse in the winter, could also consider increase wellbutrin  For now, is doing well and coping w situational stressors.

## 2024-11-21 NOTE — PROGRESS NOTES
Virtual Regular Visit  Name: Ema Boston      : 2000      MRN: [id number]  Encounter Provider: Alma Nugent MD  Encounter Date: 2024   Encounter department: [facility]      Verification of patient location:  Patient is located at Home in the following state in which I hold an active license [place] :  Assessment & Plan  Moderate recurrent major depression (HCC)  Doing well with increased dose of prozac since last visit.    Continue prozac 40 mg daily and wellbutrin xl 150 mg daily  Has refills, will message when needs again.   Ok to f/u in six months, sooner if needed  Pt to consider light therapy lamp for seasonal depressive symptoms.   If depression worse in the winter, could also consider increase wellbutrin  For now, is doing well and coping w situational stressors.         Seasonal allergies  Continue antihistamine, cough drops, fluids, rest  She will call/message if feeling worse.            Encounter provider Alma Nugent MD    The patient was identified by name and date of birth. Ema Boston was informed that this is a telemedicine visit and that the visit is being conducted through the Epic Embedded platform. She agrees to proceed..  My office door was closed. No one else was in the room.  She acknowledged consent and understanding of privacy and security of the video platform. The patient has agreed to participate and understands they can discontinue the visit at any time.    Patient is aware this is a billable service.     History of Present Illness     Pt here for virtual f/u  Less brain fog since last visit.  No negative SE with the increase in prozac from 20 mg to 40 mg.  Situational stressors w her dad's health (he may start chemo soon) and with political stressors.   Still doing things.  Writes to deal w stress. Listens to music or watches funny videos.  Winter tends to be worse for her mood- scarlett starting in January.  Seeing therapist  regularly.    PHQ-9 Severity Score (range: 0 - 27) 9 (mild depression) Critical     CLARA Score (range: 0 - 21) 11    Notes allergy symptoms the past two days, but always has allergies to some degree.   Dad is sick with upper respiratory infection.   Using cough drops and allergy medication.                 Review of Systems    Objective   There were no vitals taken for this visit.    Physical Exam  Vitals and nursing note reviewed.   Constitutional:       Appearance: Normal appearance. She is not ill-appearing.   Neurological:      Mental Status: She is alert.   Psychiatric:         Mood and Affect: Mood normal.         Behavior: Behavior normal.         Visit Time  Total Visit Duration: 17 mins face to face plus 4 mins charting.

## 2025-03-10 ENCOUNTER — OFFICE VISIT (OUTPATIENT)
Dept: FAMILY MEDICINE CLINIC | Facility: CLINIC | Age: 25
End: 2025-03-10

## 2025-03-10 VITALS
SYSTOLIC BLOOD PRESSURE: 130 MMHG | TEMPERATURE: 98.6 F | HEART RATE: 110 BPM | DIASTOLIC BLOOD PRESSURE: 98 MMHG | BODY MASS INDEX: 39.68 KG/M2 | OXYGEN SATURATION: 97 % | HEIGHT: 63 IN

## 2025-03-10 DIAGNOSIS — D22.9 NEVUS: Primary | ICD-10-CM

## 2025-03-10 DIAGNOSIS — F33.1 MODERATE RECURRENT MAJOR DEPRESSION (HCC): ICD-10-CM

## 2025-03-10 PROCEDURE — 99214 OFFICE O/P EST MOD 30 MIN: CPT | Performed by: FAMILY MEDICINE

## 2025-03-10 RX ORDER — FLUOXETINE 20 MG/1
40 TABLET, FILM COATED ORAL DAILY
Qty: 180 TABLET | Refills: 1 | Status: SHIPPED | OUTPATIENT
Start: 2025-03-10

## 2025-03-10 RX ORDER — BUPROPION HYDROCHLORIDE 150 MG/1
150 TABLET ORAL EVERY MORNING
Qty: 90 TABLET | Refills: 1 | Status: SHIPPED | OUTPATIENT
Start: 2025-03-10

## 2025-03-10 NOTE — ASSESSMENT & PLAN NOTE
Generally doing well  Situational stressors that she continues to manage  Following closely with therapist  Requests med refill  Orders:  •  buPROPion (Wellbutrin XL) 150 mg 24 hr tablet; Take 1 tablet (150 mg total) by mouth every morning  •  FLUoxetine 20 MG tablet; Take 2 tablets (40 mg total) by mouth daily

## 2025-03-10 NOTE — PROGRESS NOTES
Assessment & Plan  Nevus  Photo filed in chart  Monitor for changes and recheck next OV  Generally benign appearing- smooth borders, even color.  If changes- will refer to derm.  Unknown how long this has been present  Admits anxiety playing a role in her concern         Moderate recurrent major depression (HCC)  Generally doing well  Situational stressors that she continues to manage  Following closely with therapist  Requests med refill  Orders:  •  buPROPion (Wellbutrin XL) 150 mg 24 hr tablet; Take 1 tablet (150 mg total) by mouth every morning  •  FLUoxetine 20 MG tablet; Take 2 tablets (40 mg total) by mouth daily                   Subjective:     Ema is a 24 y.o. female here today and has the below chronic conditions:    Patient Active Problem List   Diagnosis   • Moderate recurrent major depression (HCC)   • Anxiety   • Obesity (BMI 35.0-39.9 without comorbidity)   • Hyperlipidemia, mixed   • Palpitation     Current Outpatient Medications   Medication Sig Dispense Refill   • buPROPion (Wellbutrin XL) 150 mg 24 hr tablet Take 1 tablet (150 mg total) by mouth every morning 90 tablet 1   • cetirizine (ZyrTEC) 10 mg tablet Take 10 mg by mouth daily     • FLUoxetine 20 MG tablet Take 2 tablets (40 mg total) by mouth daily 180 tablet 1   • Multiple Vitamin (MULTIVITAMIN PO) Take by mouth in the morning       No current facility-administered medications for this visit.          Chief Complaint   Patient presents with   • Follow-up     Spot on chest, wants to get look at     HPI:  - CC above per clinical staff and reviewed.    History of Present Illness  The patient presents for evaluation of a mole on her chest.    Mole on Chest  - She reports the presence of a mole on her chest, which she has been unable to monitor due to her anxiety.  - She does not express any specific concern regarding the mole.  - The mole is not associated with bleeding, itching, or peeling.  - She recalls viewing an infographic  "about melanoma signs, which exacerbated her anxiety.  - The mole has not exhibited rapid growth or change.  - not sure how long it has been present.    Notes she is following with therapist  Needs med refills for mood meds  Feels she is coping with situational stressors- her dad's cancer, his differing political views, political climate in general in the country, etc.   Mom is a good support  Tolerating medication well      The following portions of the patient's history were reviewed and updated as appropriate: allergies, current medications, past family history, past medical history, past social history, past surgical history and problem list.    ROS:  Review of Systems   As noted above.    Objective:      /98   Pulse (!) 110   Temp 98.6 °F (37 °C)   Ht 5' 3\" (1.6 m)   SpO2 97%   BMI 39.68 kg/m²   (anxious today)  BP Readings from Last 3 Encounters:   03/10/25 130/98   09/12/24 132/86   09/06/23 132/86     Wt Readings from Last 3 Encounters:   07/12/21 102 kg (224 lb)   04/28/21 102 kg (224 lb)   09/24/20 102 kg (224 lb 3.2 oz)               Physical Exam:   Physical Exam  Vitals and nursing note reviewed.   Constitutional:       Appearance: Normal appearance. She is not ill-appearing.   Skin:     Comments: See photo of two nevi L medial chest.   Neurological:      Mental Status: She is alert.   Psychiatric:      Comments: anxious            Media Information      Document Information    Clinical Image - Mobile Device   L medial chest nevi   03/10/2025 12:22 PM   Attached To:   Office Visit on 3/10/25 with Alma Nugent MD   Source Information    Alma Nugent MD  [facility]   Document History             This office visit note was generated in part with the use of [employer] and/or voice recognition dictation.   "